# Patient Record
Sex: FEMALE | Race: WHITE
[De-identification: names, ages, dates, MRNs, and addresses within clinical notes are randomized per-mention and may not be internally consistent; named-entity substitution may affect disease eponyms.]

---

## 2018-04-20 ENCOUNTER — HOSPITAL ENCOUNTER (OUTPATIENT)
Dept: HOSPITAL 38 - CC.SDS | Age: 63
End: 2018-04-20
Attending: FAMILY MEDICINE
Payer: COMMERCIAL

## 2018-04-20 VITALS — DIASTOLIC BLOOD PRESSURE: 62 MMHG | SYSTOLIC BLOOD PRESSURE: 113 MMHG

## 2018-04-20 DIAGNOSIS — Z90.710: ICD-10-CM

## 2018-04-20 DIAGNOSIS — K21.9: ICD-10-CM

## 2018-04-20 DIAGNOSIS — N95.2: ICD-10-CM

## 2018-04-20 DIAGNOSIS — K44.9: ICD-10-CM

## 2018-04-20 DIAGNOSIS — M19.90: ICD-10-CM

## 2018-04-20 DIAGNOSIS — Z90.89: ICD-10-CM

## 2018-04-20 DIAGNOSIS — K31.9: ICD-10-CM

## 2018-04-20 DIAGNOSIS — Z90.49: ICD-10-CM

## 2018-04-20 DIAGNOSIS — Z79.899: ICD-10-CM

## 2018-04-20 DIAGNOSIS — E11.9: ICD-10-CM

## 2018-04-20 DIAGNOSIS — K31.7: Primary | ICD-10-CM

## 2018-04-20 DIAGNOSIS — Z12.11: ICD-10-CM

## 2018-04-20 DIAGNOSIS — Z86.010: ICD-10-CM

## 2018-04-20 DIAGNOSIS — K29.80: ICD-10-CM

## 2018-04-20 DIAGNOSIS — K29.70: ICD-10-CM

## 2018-04-20 DIAGNOSIS — E55.9: ICD-10-CM

## 2018-04-20 DIAGNOSIS — E78.5: ICD-10-CM

## 2018-04-20 DIAGNOSIS — I10: ICD-10-CM

## 2018-04-20 DIAGNOSIS — Z98.890: ICD-10-CM

## 2018-04-20 DIAGNOSIS — Z88.8: ICD-10-CM

## 2018-04-20 DIAGNOSIS — Z79.4: ICD-10-CM

## 2018-04-20 NOTE — OR
DATE OF OPERATION:  04/20/2018

 

PREOPERATIVE DIAGNOSIS:

1. GASTROESOPHAGEAL REFLUX DISEASE.

2. HISTORY OF POLYPS.

 

POSTOPERATIVE DIAGNOSIS:

1. GASTROESOPHAGEAL REFLUX DISEASE.

2. HISTORY OF POLYPS.

 

SURGEON:  Addison Howard MD

 

PROCEDURE:

1. EGD WITH BIOPSIES X4, HILDA.  POLYP REMOVAL X2.

2. FULL-LENGTH COLONOSCOPY.

 

ANESTHESIA:  CRNA.

 

COMPLICATIONS:  None.

 

SPECIMEN:

1. Duodenal biopsy x1.

2. Antral biopsy x2.

3. HILDA.

4. Fundal on antral polyps x2.

 

FINDINGS:

1. Full-length EGD.

2. Duodenitis and gastritis.

3. Benign adenomatous polyps, antrum, and fundus.

4. Small hiatal hernia with spontaneous GERD.  No esophagitis or West's.

5. Colonoscopy to right colon.

6. Extremely poor bowel prep.

 

RECOMMENDATIONS:  The patient will be seen by Dr. Andrews in followup for her

biopsies and treatment of her chronic reflux.  Her colonoscopy showed no gross

abnormalities, but this was very unreliable due to the extremely poor bowel

prep.  The patient may want to consider repeat colonoscopy in another date.

 

INDICATIONS:  The patient was going to see Dr. Andrews for some heartburn.  He

wondered have an EGD and a colonoscopy because she has had a history of colon

polyps.

 

DESCRIPTION OF PROCEDURE:  The patient was prepped and draped and placed in the

left lateral decubitus position.  A lubricated Olympus gastroscope was inserted

over a bit and advanced to cricopharyngeus area and easily intubated in the

Esophagus.  Esophageal line was benign in its entire course.  The Z-line was

crisp and sharp at 38 cm.  There was a small hiatal hernia present with some

spontaneous reflux, but no distal esophagitis, stricturing, ulceration, or

West's changes.  The scope was advanced in the stomach through the pylorus

into the second portion of the duodenum, which was normal.  Duodenal bulb had

some mild duodenitis.  Biopsy was taken.  Scope was brought back into the

stomach, retroflexed the upper fundus and cardia were essentially unremarkable

other than the patient's hernia seen from below.  The patient does have some

adenomatous polyps in the fundus and one in the antrum, we took two of these off

with cold forceps, one in the fundus and one in the antrum.  There was some very

mild antral gastritis distal around the pylorus, two biopsies were obtained

along with HILDA test.  Air was then suctioned and the scope removed without

complication.

 

A lubricated Olympus colonoscope was then inserted and advanced with some degree

of caution to the right colon, the patient had an extremely poor bowel prep,

very difficult to see, we had to suction repeatedly and unfortunately there was

a lot of particulate food left in her stool.  We were able to get past the

hepatic flexure, but once in the right colon could not visualize or see the

cecum due to the volume of stool present.  Upon withdrawal, there were no gross

abnormalities through the length of the colon including any polyps, mass,

ulceration, bleeding sites, vascular abnormalities or signs of colitis.  No

significant diverticula were seen in the left colon.  The patient's prep as

stated already was very poor and/or many areas we cannot see at all and then

some short stretches of clear colon.  Attempts were made to suction, but it was

very difficult as the scope kept plugging.  No retroflexion of the rectum could

be accomplished due to stool volume safely.  Air was suctioned as best as

possible.  Scope was removed without complication.

 

HEMALATHA/MOOKIE

DD:  04/20/2018 07:41:06

DT:  04/20/2018 08:11:52

Job #:  [id number]